# Patient Record
(demographics unavailable — no encounter records)

---

## 2024-12-04 NOTE — HEALTH RISK ASSESSMENT
[No] : No [1 or 2 (0 pts)] : 1 or 2 (0 points) [Never (0 pts)] : Never (0 points) [0] : 2) Feeling down, depressed, or hopeless: Not at all (0) [PHQ-2 Negative - No further assessment needed] : PHQ-2 Negative - No further assessment needed [Patient reported PAP Smear was normal] : Patient reported PAP Smear was normal [] :  [Former] : Former [5-9] : 5-9 [With Significant Other] : lives with significant other [Employed] : employed [Sexually Active] : sexually active [de-identified] : was drinking heavily since 18 but has stopped for the past 4 months [Audit-CScore] : 0 [de-identified] : Yoga frequently, lifts weights [de-identified] : Generally pretty good, varied, not too much fast food/fried [[id number]] : 0 [High Risk Behavior] : no high risk behavior [PapSmearDate] : 2022 [PapSmearComments] : saw GYN 2 years ago, has history of ovarian cysts and had ultrasound. [FreeTextEntry2] : eloisa - will become  [de-identified] : quit 4 months ago. Uses vape 3 days a week. two packs a week for 15 years. Uses marijuana and mushrooms recreationally infrequently

## 2024-12-04 NOTE — HISTORY OF PRESENT ILLNESS
[de-identified] : The patient is a 35-year-old female with a past medical history of ADHD and seasonal depression, who presents for CPE. She is currently using over-the-counter medications including Mucinex, Zyrtec, and Flonase, and is on tretinoin prescribed by dermatology. She has mild cold symptoms which have improved over the past ~1 week. The patient attends therapy regularly for her depression history. She has a history of heavy alcohol use and smoking but has been abstinent for 4 months, now only vaping a few times a week. She reports no other concerns today.

## 2024-12-04 NOTE — HEALTH RISK ASSESSMENT
[No] : No [1 or 2 (0 pts)] : 1 or 2 (0 points) [Never (0 pts)] : Never (0 points) [0] : 2) Feeling down, depressed, or hopeless: Not at all (0) [PHQ-2 Negative - No further assessment needed] : PHQ-2 Negative - No further assessment needed [Patient reported PAP Smear was normal] : Patient reported PAP Smear was normal [] :  [Former] : Former [5-9] : 5-9 [With Significant Other] : lives with significant other [Employed] : employed [Sexually Active] : sexually active [de-identified] : was drinking heavily since 18 but has stopped for the past 4 months [Audit-CScore] : 0 [de-identified] : Yoga frequently, lifts weights [de-identified] : Generally pretty good, varied, not too much fast food/fried [[id number]] : 0 [High Risk Behavior] : no high risk behavior [PapSmearDate] : 2022 [PapSmearComments] : saw GYN 2 years ago, has history of ovarian cysts and had ultrasound. [FreeTextEntry2] : eloisa - will become  [de-identified] : quit 4 months ago. Uses vape 3 days a week. two packs a week for 15 years. Uses marijuana and mushrooms recreationally infrequently

## 2024-12-04 NOTE — HISTORY OF PRESENT ILLNESS
[de-identified] : The patient is a 35-year-old female with a past medical history of ADHD and seasonal depression, who presents for CPE. She is currently using over-the-counter medications including Mucinex, Zyrtec, and Flonase, and is on tretinoin prescribed by dermatology. She has mild cold symptoms which have improved over the past ~1 week. The patient attends therapy regularly for her depression history. She has a history of heavy alcohol use and smoking but has been abstinent for 4 months, now only vaping a few times a week. She reports no other concerns today.

## 2025-01-10 NOTE — HISTORY OF PRESENT ILLNESS
[FreeTextEntry1] : f/u [de-identified] : Pt presents for f/u, she is doing well  Still has some back pain and would like to try massage therapy vs acupuncture  needs refill of her albuterol  Would like to talk to new therapist.

## 2025-01-10 NOTE — PHYSICAL EXAM
[Normal Sclera/Conjunctiva] : normal sclera/conjunctiva [EOMI] : extraocular movements intact [Normal] : normal rate, regular rhythm, normal S1 and S2 and no murmur heard [Coordination Grossly Intact] : coordination grossly intact [No Focal Deficits] : no focal deficits [Normal Gait] : normal gait [Normal Affect] : the affect was normal [Normal Insight/Judgement] : insight and judgment were intact

## 2025-01-10 NOTE — HISTORY OF PRESENT ILLNESS
[FreeTextEntry1] : f/u [de-identified] : Pt presents for f/u, she is doing well  Still has some back pain and would like to try massage therapy vs acupuncture  needs refill of her albuterol  Would like to talk to new therapist.

## 2025-02-14 NOTE — REVIEW OF SYSTEMS
[Fever] : no fever [Chills] : no chills [Fatigue] : no fatigue [Discharge] : no discharge [Pain] : no pain [Redness] : no redness [Earache] : no earache [Hearing Loss] : no hearing loss [Hoarseness] : hoarseness [Postnasal Drip] : postnasal drip [Chest Pain] : no chest pain [Palpitations] : no palpitations [Lower Ext Edema] : no lower extremity edema [Shortness Of Breath] : no shortness of breath [Wheezing] : no wheezing [Cough] : no cough [Abdominal Pain] : no abdominal pain [Nausea] : no nausea [Constipation] : no constipation [Diarrhea] : diarrhea [Vomiting] : no vomiting [Heartburn] : no heartburn [Dysuria] : no dysuria [Incontinence] : no incontinence [Frequency] : no frequency [Joint Pain] : no joint pain [Joint Stiffness] : no joint stiffness [Itching] : no itching [Skin Rash] : no skin rash [Headache] : no headache [Dizziness] : no dizziness

## 2025-02-14 NOTE — HISTORY OF PRESENT ILLNESS
[FreeTextEntry1] : Initial visit for atopic eval Notes mult allergies 30 years ago Sx are nasal congestion mucous hives sinusitis childhood asthma no eczema - uses Benadryl or Zyrtec Flonase and other OTC rx no cough or wheeze Has albuterol inhaler not needed No nocturnal awakenings Sx are all year long better winter and summer  No change w/ time of day [de-identified] : Intial atopic eval [TextEntry] : Pmhx - Rhinitis/ asthma PSurghx - none All - Sulfa rash and fever NKFA Has not had COVID Famhx - Non contributory Sochx - Former smoker quit 5 months ago Occup/ hobby    no children Home - No carpet + blinds no pets no smokers, gas hot water heat

## 2025-02-14 NOTE — PLAN
[FreeTextEntry1] : Kristy Skin prick testing off antihistamines Con't Flonase Inc po fluids All questions answered Re check 1 week

## 2025-02-14 NOTE — PHYSICAL EXAM
[No Acute Distress] : no acute distress [Normal Sclera/Conjunctiva] : normal sclera/conjunctiva [EOMI] : extraocular movements intact [Normal Outer Ear/Nose] : the outer ears and nose were normal in appearance [Normal TMs] : both tympanic membranes were normal [No JVD] : no jugular venous distention [No Lymphadenopathy] : no lymphadenopathy [Normal Rate] : normal rate  [Regular Rhythm] : with a regular rhythm [No Edema] : there was no peripheral edema [Soft] : abdomen soft [Non Tender] : non-tender [Normal Bowel Sounds] : normal bowel sounds [No CVA Tenderness] : no CVA  tenderness [No Joint Swelling] : no joint swelling [No Rash] : no rash [Normal Gait] : normal gait

## 2025-02-24 NOTE — PLAN
[FreeTextEntry1] : Skin prick testing x 30 - Ragweed Tree Grass and Cat treasure intradermal @ 100  Resume meds Home environmental literature discussed Con't Flonase Re check 3 weeks

## 2025-02-24 NOTE — HISTORY OF PRESENT ILLNESS
[FreeTextEntry1] : Here for Allergy testing Inc sx only on Flonase + sneeze PND No fever chills cough or wheeze No additional complaints [de-identified] : Skin prick testing

## 2025-02-24 NOTE — PHYSICAL EXAM
[No Acute Distress] : no acute distress [Normal Sclera/Conjunctiva] : normal sclera/conjunctiva [EOMI] : extraocular movements intact [Normal Outer Ear/Nose] : the outer ears and nose were normal in appearance [Normal TMs] : both tympanic membranes were normal [No JVD] : no jugular venous distention [No Lymphadenopathy] : no lymphadenopathy [No Respiratory Distress] : no respiratory distress  [Clear to Auscultation] : lungs were clear to auscultation bilaterally [Normal Rate] : normal rate  [No Edema] : there was no peripheral edema [Soft] : abdomen soft [Non Tender] : non-tender [Normal Bowel Sounds] : normal bowel sounds [No CVA Tenderness] : no CVA  tenderness [No Joint Swelling] : no joint swelling [No Rash] : no rash [Normal Gait] : normal gait

## 2025-02-24 NOTE — REVIEW OF SYSTEMS
[Discharge] : discharge [Fever] : no fever [Chills] : no chills [Fatigue] : no fatigue [Pain] : no pain [Itching] : no itching [Earache] : no earache [Hearing Loss] : no hearing loss [Sore Throat] : no sore throat [Postnasal Drip] : no postnasal drip [Chest Pain] : no chest pain [Shortness Of Breath] : no shortness of breath [Wheezing] : no wheezing [Cough] : no cough [Abdominal Pain] : no abdominal pain [Nausea] : no nausea [Constipation] : no constipation [Vomiting] : no vomiting [Dysuria] : no dysuria [Incontinence] : no incontinence [Frequency] : no frequency [Joint Pain] : no joint pain [Itching] : no itching [Skin Rash] : no skin rash [Headache] : no headache [Dizziness] : no dizziness

## 2025-03-24 NOTE — PHYSICAL EXAM
[No Acute Distress] : no acute distress [Normal Sclera/Conjunctiva] : normal sclera/conjunctiva [PERRL] : pupils equal round and reactive to light [Normal Outer Ear/Nose] : the outer ears and nose were normal in appearance [Normal TMs] : both tympanic membranes were normal [No JVD] : no jugular venous distention [No Lymphadenopathy] : no lymphadenopathy [No Respiratory Distress] : no respiratory distress  [No Accessory Muscle Use] : no accessory muscle use [Normal Rate] : normal rate  [No Edema] : there was no peripheral edema [Soft] : abdomen soft [Non Tender] : non-tender [Normal Bowel Sounds] : normal bowel sounds [No CVA Tenderness] : no CVA  tenderness [No Joint Swelling] : no joint swelling [No Rash] : no rash [No Focal Deficits] : no focal deficits

## 2025-03-24 NOTE — ASSESSMENT
[FreeTextEntry1] : Intradermals @ 100 # 8 all neg w/ + control No change w/tx Recc Allegra 180 qam and flonase 2 each nostril daily Recc Immunotherapy Rag MW Tree Grass and Dog All questions answered Re check 1 week

## 2025-03-24 NOTE — REVIEW OF SYSTEMS
[Fever] : no fever [Chills] : no chills [Fatigue] : no fatigue [Discharge] : no discharge [Pain] : no pain [Earache] : no earache [Hearing Loss] : no hearing loss [Sore Throat] : no sore throat [Chest Pain] : no chest pain [Palpitations] : no palpitations [Shortness Of Breath] : no shortness of breath [Wheezing] : no wheezing [Cough] : no cough [Abdominal Pain] : no abdominal pain [Nausea] : no nausea [Vomiting] : no vomiting [Heartburn] : no heartburn [Dysuria] : no dysuria [Joint Pain] : no joint pain [Itching] : no itching [Skin Rash] : no skin rash [Headache] : no headache [Memory Loss] : no memory loss

## 2025-07-29 NOTE — HISTORY OF PRESENT ILLNESS
[FreeTextEntry1] : broken toe, sleeping issues [de-identified] : Patient had injured her toe, went to [facility] who did xrays, found hairline fx and toe dislocation patient is still unable to bend the great toe denies significant pain  Also notes issues initiating sleep recently and has been dealing with some family issues. She requests something to help her fall asleep She has been using benadryl

## 2025-07-29 NOTE — PHYSICAL EXAM
[Normal] : no acute distress, well nourished, well developed and well-appearing [Normal Sclera/Conjunctiva] : normal sclera/conjunctiva [EOMI] : extraocular movements intact [Coordination Grossly Intact] : coordination grossly intact [No Focal Deficits] : no focal deficits [Normal Gait] : normal gait [Normal Affect] : the affect was normal [Normal Insight/Judgement] : insight and judgment were intact [de-identified] : R toe without significant tenderness to palpation; inability to bend toe